# Patient Record
Sex: FEMALE | Race: WHITE
[De-identification: names, ages, dates, MRNs, and addresses within clinical notes are randomized per-mention and may not be internally consistent; named-entity substitution may affect disease eponyms.]

---

## 2017-03-29 ENCOUNTER — HOSPITAL ENCOUNTER (EMERGENCY)
Dept: HOSPITAL 26 - MED | Age: 27
Discharge: HOME | End: 2017-03-29
Payer: MEDICAID

## 2017-03-29 VITALS — SYSTOLIC BLOOD PRESSURE: 139 MMHG | DIASTOLIC BLOOD PRESSURE: 75 MMHG

## 2017-03-29 VITALS — DIASTOLIC BLOOD PRESSURE: 73 MMHG | SYSTOLIC BLOOD PRESSURE: 120 MMHG

## 2017-03-29 VITALS — BODY MASS INDEX: 50.79 KG/M2 | HEIGHT: 62 IN | WEIGHT: 276 LBS

## 2017-03-29 DIAGNOSIS — Z86.73: ICD-10-CM

## 2017-03-29 DIAGNOSIS — F17.200: ICD-10-CM

## 2017-03-29 DIAGNOSIS — R59.0: Primary | ICD-10-CM

## 2017-03-29 DIAGNOSIS — I10: ICD-10-CM

## 2017-03-29 PROCEDURE — 99283 EMERGENCY DEPT VISIT LOW MDM: CPT

## 2017-03-29 PROCEDURE — 96372 THER/PROPH/DIAG INJ SC/IM: CPT

## 2017-03-29 NOTE — NUR
26F BIB SELF C/O ACHING LEFT JAW PAIN, NON-RADIATING, 9/10 X YESTERDAY; PT 
DENIES TRAUMA OR INJURY TO FACE; NO SWELLING OR REDNESS NOTED TO SITE AT THIS 
TIME; PT STATED " I HAVE TWO TEETH ON THE TOP AND BOTTOM LEFT THAT NEED TO BE 
REMOVED. I THINK IT MAY BE FROM THAT"; A&OX4, PERRL, BL LUNG SOUNDS CLEAR, RR 
EVEN/UNLABORED, PT DENIES N/V/D AT THIS TIME; STEADY GAIT; PT RESTING IN BED W/ 
HOB ELEVATED AND IN LOWEST POSITION; POSITIONED FOR COMFORT; ER MD MADE AWARE 
OF STATUS. WILL CONTINUE TO MONITOR.

## 2017-03-29 NOTE — NUR
Patient discharged with v/s stable. Written and verbal after care instructions 
given and explained. 

Patient alert, oriented and verbalized understanding of instructions. 
Ambulatory with steady gait. All questions addressed prior to discharge. ID 
band removed. Patient advised to follow up with PMD. Rx of NORCO 5MG-325MG TAB 
& MOTRIN 800MG given. Patient educated on indication of medication including 
possible reaction and side effects. Opportunity to ask questions provided and 
answered.

## 2020-07-21 ENCOUNTER — HOSPITAL ENCOUNTER (EMERGENCY)
Dept: HOSPITAL 26 - MED | Age: 30
Discharge: HOME | End: 2020-07-21
Payer: MEDICAID

## 2020-07-21 VITALS — WEIGHT: 188 LBS | HEIGHT: 64 IN | BODY MASS INDEX: 32.1 KG/M2

## 2020-07-21 VITALS — SYSTOLIC BLOOD PRESSURE: 128 MMHG | DIASTOLIC BLOOD PRESSURE: 85 MMHG

## 2020-07-21 DIAGNOSIS — R10.9: Primary | ICD-10-CM

## 2020-07-21 DIAGNOSIS — Z86.73: ICD-10-CM

## 2020-07-21 DIAGNOSIS — I10: ICD-10-CM

## 2020-07-21 NOTE — NUR
28 y/o female from home c/o sudden onset abd pain. Pt c/o abd pain s/p eating 
shrimp. Abd soft, round, nontender to palp. Pt states she "forced" herself to 
vomit due to pain. RR even and unlabored, awake and alert. vss



medhx: stroke, seizures

## 2020-07-21 NOTE — NUR
Patient discharged with v/s stable. Written and verbal after care instructions 
given and explained. 

Patient alert, oriented and verbalized understanding of instructions. 
Ambulatory with steady gait. All questions addressed prior to discharge. ID 
band removed. Patient advised to follow up with PMD. Rx of pepcid 20mg, zofran 
4mg, norco 5mg given. Patient educated on indication of medication including 
possible reaction and side effects. Opportunity to ask questions provided and 
answered.

## 2020-08-27 ENCOUNTER — HOSPITAL ENCOUNTER (EMERGENCY)
Dept: HOSPITAL 26 - MED | Age: 30
Discharge: HOME | End: 2020-08-27
Payer: MEDICAID

## 2020-08-27 VITALS — DIASTOLIC BLOOD PRESSURE: 46 MMHG | SYSTOLIC BLOOD PRESSURE: 98 MMHG

## 2020-08-27 VITALS — BODY MASS INDEX: 30.9 KG/M2 | HEIGHT: 64 IN | WEIGHT: 181 LBS

## 2020-08-27 VITALS — SYSTOLIC BLOOD PRESSURE: 98 MMHG | DIASTOLIC BLOOD PRESSURE: 46 MMHG

## 2020-08-27 DIAGNOSIS — Z98.890: ICD-10-CM

## 2020-08-27 DIAGNOSIS — N83.201: Primary | ICD-10-CM

## 2020-08-27 DIAGNOSIS — I10: ICD-10-CM

## 2020-08-27 DIAGNOSIS — R56.9: ICD-10-CM

## 2020-08-27 DIAGNOSIS — Z86.73: ICD-10-CM

## 2020-08-27 DIAGNOSIS — K52.9: ICD-10-CM

## 2020-08-27 LAB
ALBUMIN FLD-MCNC: 3.8 G/DL (ref 3.4–5)
ANION GAP SERPL CALCULATED.3IONS-SCNC: 13.3 MMOL/L (ref 8–16)
AST SERPL-CCNC: 19 U/L (ref 15–37)
BASOPHILS # BLD AUTO: 0 K/UL (ref 0–0.22)
BASOPHILS NFR BLD AUTO: 0.4 % (ref 0–2)
BILIRUB SERPL-MCNC: 0.2 MG/DL (ref 0–1)
BUN SERPL-MCNC: 11 MG/DL (ref 7–18)
CHLORIDE SERPL-SCNC: 105 MMOL/L (ref 98–107)
CO2 SERPL-SCNC: 26.5 MMOL/L (ref 21–32)
CREAT SERPL-MCNC: 0.8 MG/DL (ref 0.6–1.3)
EOSINOPHIL # BLD AUTO: 0.3 K/UL (ref 0–0.4)
EOSINOPHIL NFR BLD AUTO: 4.5 % (ref 0–4)
ERYTHROCYTE [DISTWIDTH] IN BLOOD BY AUTOMATED COUNT: 20.1 % (ref 11.6–13.7)
GFR SERPL CREATININE-BSD FRML MDRD: 108 ML/MIN (ref 90–?)
GLUCOSE SERPL-MCNC: 86 MG/DL (ref 74–106)
HCT VFR BLD AUTO: 33.9 % (ref 36–48)
HGB BLD-MCNC: 10.7 G/DL (ref 12–16)
LYMPHOCYTES # BLD AUTO: 1.7 K/UL (ref 2.5–16.5)
LYMPHOCYTES NFR BLD AUTO: 22.1 % (ref 20.5–51.1)
MCH RBC QN AUTO: 23 PG (ref 27–31)
MCHC RBC AUTO-ENTMCNC: 31 G/DL (ref 33–37)
MCV RBC AUTO: 73.5 FL (ref 80–94)
MONOCYTES # BLD AUTO: 0.4 K/UL (ref 0.8–1)
MONOCYTES NFR BLD AUTO: 5.4 % (ref 1.7–9.3)
NEUTROPHILS # BLD AUTO: 5.2 K/UL (ref 1.8–7.7)
NEUTROPHILS NFR BLD AUTO: 67.6 % (ref 42.2–75.2)
PLATELET # BLD AUTO: 237 K/UL (ref 140–450)
POTASSIUM SERPL-SCNC: 3.8 MMOL/L (ref 3.5–5.1)
RBC # BLD AUTO: 4.62 MIL/UL (ref 4.2–5.4)
SODIUM SERPL-SCNC: 141 MMOL/L (ref 136–145)
WBC # BLD AUTO: 7.7 K/UL (ref 4.8–10.8)

## 2020-08-27 PROCEDURE — 36415 COLL VENOUS BLD VENIPUNCTURE: CPT

## 2020-08-27 PROCEDURE — 99284 EMERGENCY DEPT VISIT MOD MDM: CPT

## 2020-08-27 PROCEDURE — 96361 HYDRATE IV INFUSION ADD-ON: CPT

## 2020-08-27 PROCEDURE — 76856 US EXAM PELVIC COMPLETE: CPT

## 2020-08-27 PROCEDURE — 96374 THER/PROPH/DIAG INJ IV PUSH: CPT

## 2020-08-27 PROCEDURE — 80053 COMPREHEN METABOLIC PANEL: CPT

## 2020-08-27 PROCEDURE — 85025 COMPLETE CBC W/AUTO DIFF WBC: CPT

## 2020-08-27 NOTE — NUR
Patient discharged with v/s stable. Written and verbal after care instructions 
given and explained. 

Patient alert, oriented and verbalized understanding of instructions. 
Ambulatory with steady gait. All questions addressed prior to discharge. ID 
band removed. Patient advised to follow up with PMD. Rx of ZOFRAN & MOTRIN 
given. Patient educated on indication of medication including possible reaction 
and side effects. Opportunity to ask questions provided and answered.

## 2020-08-27 NOTE — NUR
ADMITTED PT ON BED 12. PT A/A/OX4, AMBULATORY. PT C/O ABDOMINAL PAIN WHICH 
STARTED AFTER THE PT ATE SALAD AND HAD 3 EPISODES OF LOOSE BM,DARK IN COLOR PER 
PT. ALSO STATED THAT SHE IS TAKING IRON PILLS FOR ANEMIA. OTHERWISE NO SIGN AND 
SYMPTOMS OF DISTRESS NOTED AT THIS TIME. STARTED AN IV ON THE LEFT HAND GAUGE 
20.ALL LABS DRAWN AS ORDERED. TORADOL GIVEN FOR ABDOMINAL PAIN 10/10 AS 
ORDERED.IVF NS INFUSING AS ORDERED.

## 2020-09-09 ENCOUNTER — HOSPITAL ENCOUNTER (EMERGENCY)
Dept: HOSPITAL 26 - MED | Age: 30
Discharge: HOME | End: 2020-09-09
Payer: MEDICAID

## 2020-09-09 VITALS — SYSTOLIC BLOOD PRESSURE: 129 MMHG | DIASTOLIC BLOOD PRESSURE: 73 MMHG

## 2020-09-09 VITALS — SYSTOLIC BLOOD PRESSURE: 136 MMHG | DIASTOLIC BLOOD PRESSURE: 78 MMHG

## 2020-09-09 VITALS — HEIGHT: 61 IN | WEIGHT: 181 LBS | BODY MASS INDEX: 34.17 KG/M2

## 2020-09-09 DIAGNOSIS — N39.0: ICD-10-CM

## 2020-09-09 DIAGNOSIS — N83.209: ICD-10-CM

## 2020-09-09 DIAGNOSIS — K52.9: Primary | ICD-10-CM

## 2020-09-09 DIAGNOSIS — Z98.890: ICD-10-CM

## 2020-09-09 DIAGNOSIS — I63.9: ICD-10-CM

## 2020-09-09 DIAGNOSIS — R56.9: ICD-10-CM

## 2020-09-09 PROCEDURE — 96375 TX/PRO/DX INJ NEW DRUG ADDON: CPT

## 2020-09-09 PROCEDURE — 81025 URINE PREGNANCY TEST: CPT

## 2020-09-09 PROCEDURE — 99284 EMERGENCY DEPT VISIT MOD MDM: CPT

## 2020-09-09 PROCEDURE — 81002 URINALYSIS NONAUTO W/O SCOPE: CPT

## 2020-09-09 PROCEDURE — 96361 HYDRATE IV INFUSION ADD-ON: CPT

## 2020-09-09 PROCEDURE — 96365 THER/PROPH/DIAG IV INF INIT: CPT

## 2020-09-09 NOTE — NUR
Patient discharged with v/s stable. Written and verbal after care instructions 
given and explained. 

Patient alert, oriented and verbalized understanding of instructions. 
Ambulatory with steady gait. All questions addressed prior to discharge. ID 
band removed. Patient advised to follow up with PMD. Rx of CEPHALEXIN, EXTRA 
STRENGTH TYLENOL given. Patient educated on indication of medication including 
possible reaction and side effects. Opportunity to ask questions provided and 
answered.

## 2020-09-09 NOTE — NUR
31 YO F BIB SELF FOR C/C OF 10/10 MID EPIGASTRIC PAIN X1.5 HOURS. PT STATES SHE 
HAS HAD N/V/D SINCE THE PAIN STARTED  "OUT OF NO WHERE." DENIES FEVER, COUGH, 
SOB AND BODY ACHES. LBM WAS 1 HR AGO, LOOSE STOOL. PT STATES SHE TOOK IBUPROFEN 
800MG AND 4MG OF ZOFRAN 45 MIN AGO WITHOUT RELIEF OF SYMPTOMS. PT RECENTLY HAS 
D/C FROM [facility] ER FOR OVARIAN CYSTS, STATES HER PAIN IS THE SAME AS THEN. 



NKA

MED HX:OVARIAN CYST 

RX: ZOFRAN PRN

## 2020-10-07 ENCOUNTER — HOSPITAL ENCOUNTER (EMERGENCY)
Dept: HOSPITAL 26 - MED | Age: 30
Discharge: HOME | End: 2020-10-07
Payer: MEDICAID

## 2020-10-07 VITALS — HEIGHT: 64 IN | WEIGHT: 179 LBS | BODY MASS INDEX: 30.56 KG/M2

## 2020-10-07 VITALS — SYSTOLIC BLOOD PRESSURE: 120 MMHG | DIASTOLIC BLOOD PRESSURE: 72 MMHG

## 2020-10-07 VITALS — DIASTOLIC BLOOD PRESSURE: 70 MMHG | SYSTOLIC BLOOD PRESSURE: 119 MMHG

## 2020-10-07 DIAGNOSIS — Z98.890: ICD-10-CM

## 2020-10-07 DIAGNOSIS — R19.7: ICD-10-CM

## 2020-10-07 DIAGNOSIS — R56.9: ICD-10-CM

## 2020-10-07 DIAGNOSIS — I63.9: ICD-10-CM

## 2020-10-07 DIAGNOSIS — R10.9: Primary | ICD-10-CM

## 2020-10-07 DIAGNOSIS — R11.2: ICD-10-CM

## 2020-10-07 PROCEDURE — 81002 URINALYSIS NONAUTO W/O SCOPE: CPT

## 2020-10-07 PROCEDURE — 81025 URINE PREGNANCY TEST: CPT

## 2020-10-07 PROCEDURE — 99283 EMERGENCY DEPT VISIT LOW MDM: CPT

## 2020-10-07 PROCEDURE — 96372 THER/PROPH/DIAG INJ SC/IM: CPT

## 2020-10-07 RX ADMIN — KETOROLAC TROMETHAMINE ONE MG: 30 INJECTION, SOLUTION INTRAMUSCULAR; INTRAVENOUS at 13:22

## 2020-10-07 NOTE — NUR
PT IN FOR N/V X1DAY AND ABDOMINAL PAIN. PATIENT CURRENTLY WITH 10/10 PAIN 
THROUGHOUT ABDOMEN, WITH TENDERNESS AND GUARDING ON ASSESSMENT. PT ALSO STATES 
DIARRHEA YESTERDAY BUT NONE TODAY. PT STATES MENSTRAL CYCLE IS LATE BUT DENIES 
POSSIBILITY THAT SHE IS PREGNANT. RESTING IN BED AT THIS TIME WITHOUT ANY SIGNS 
OF DISTRESS.

## 2020-12-22 ENCOUNTER — HOSPITAL ENCOUNTER (EMERGENCY)
Dept: HOSPITAL 26 - MED | Age: 30
Discharge: LEFT BEFORE DISCHARGE | End: 2020-12-22
Payer: MEDICAID

## 2020-12-22 VITALS — SYSTOLIC BLOOD PRESSURE: 137 MMHG | DIASTOLIC BLOOD PRESSURE: 74 MMHG

## 2020-12-22 VITALS — HEIGHT: 64 IN | WEIGHT: 175 LBS | BODY MASS INDEX: 29.88 KG/M2

## 2020-12-22 DIAGNOSIS — Z53.21: Primary | ICD-10-CM

## 2020-12-22 NOTE — NUR
-------------------------------------------------------------------------------

            *** Note undone in [facility] - 12/23/20 at 0620 by MICHELA ***            

-------------------------------------------------------------------------------

CALLED FOR THE SECOND TIME , NO RESPONSE

## 2021-04-09 ENCOUNTER — HOSPITAL ENCOUNTER (EMERGENCY)
Dept: HOSPITAL 26 - MED | Age: 31
Discharge: HOME | End: 2021-04-09
Payer: MEDICAID

## 2021-04-09 VITALS — DIASTOLIC BLOOD PRESSURE: 60 MMHG | SYSTOLIC BLOOD PRESSURE: 105 MMHG

## 2021-04-09 VITALS — BODY MASS INDEX: 30.39 KG/M2 | WEIGHT: 178 LBS | HEIGHT: 64 IN

## 2021-04-09 VITALS — SYSTOLIC BLOOD PRESSURE: 114 MMHG | DIASTOLIC BLOOD PRESSURE: 74 MMHG

## 2021-04-09 DIAGNOSIS — K80.80: Primary | ICD-10-CM

## 2021-04-09 DIAGNOSIS — Z86.73: ICD-10-CM

## 2021-04-09 LAB
ALBUMIN FLD-MCNC: 3.6 G/DL (ref 3.4–5)
ANION GAP SERPL CALCULATED.3IONS-SCNC: 9 MMOL/L (ref 8–16)
AST SERPL-CCNC: 12 U/L (ref 15–37)
BASOPHILS # BLD AUTO: 0 K/UL (ref 0–0.22)
BASOPHILS NFR BLD AUTO: 0.8 % (ref 0–2)
BILIRUB SERPL-MCNC: 0.2 MG/DL (ref 0–1)
BUN SERPL-MCNC: 8 MG/DL (ref 7–18)
CHLORIDE SERPL-SCNC: 106 MMOL/L (ref 98–107)
CO2 SERPL-SCNC: 26.5 MMOL/L (ref 21–32)
CREAT SERPL-MCNC: 0.7 MG/DL (ref 0.6–1.3)
EOSINOPHIL # BLD AUTO: 0.1 K/UL (ref 0–0.4)
EOSINOPHIL NFR BLD AUTO: 2.8 % (ref 0–4)
ERYTHROCYTE [DISTWIDTH] IN BLOOD BY AUTOMATED COUNT: 17.6 % (ref 11.6–13.7)
GFR SERPL CREATININE-BSD FRML MDRD: 125 ML/MIN (ref 90–?)
GLUCOSE SERPL-MCNC: 73 MG/DL (ref 74–106)
HCT VFR BLD AUTO: 30.8 % (ref 36–48)
HGB BLD-MCNC: 9.6 G/DL (ref 12–16)
LIPASE SERPL-CCNC: 68 U/L (ref 73–393)
LYMPHOCYTES # BLD AUTO: 2 K/UL (ref 2.5–16.5)
LYMPHOCYTES NFR BLD AUTO: 38.7 % (ref 20.5–51.1)
MCH RBC QN AUTO: 23 PG (ref 27–31)
MCHC RBC AUTO-ENTMCNC: 31 G/DL (ref 33–37)
MCV RBC AUTO: 74.3 FL (ref 80–94)
MONOCYTES # BLD AUTO: 0.5 K/UL (ref 0.8–1)
MONOCYTES NFR BLD AUTO: 9.3 % (ref 1.7–9.3)
NEUTROPHILS # BLD AUTO: 2.4 K/UL (ref 1.8–7.7)
NEUTROPHILS NFR BLD AUTO: 48.4 % (ref 42.2–75.2)
PLATELET # BLD AUTO: 247 K/UL (ref 140–450)
POTASSIUM SERPL-SCNC: 3.5 MMOL/L (ref 3.5–5.1)
RBC # BLD AUTO: 4.14 MIL/UL (ref 4.2–5.4)
SODIUM SERPL-SCNC: 138 MMOL/L (ref 136–145)
WBC # BLD AUTO: 5 K/UL (ref 4.8–10.8)

## 2021-04-09 PROCEDURE — 81025 URINE PREGNANCY TEST: CPT

## 2021-04-09 PROCEDURE — 76705 ECHO EXAM OF ABDOMEN: CPT

## 2021-04-09 PROCEDURE — 80053 COMPREHEN METABOLIC PANEL: CPT

## 2021-04-09 PROCEDURE — 81002 URINALYSIS NONAUTO W/O SCOPE: CPT

## 2021-04-09 PROCEDURE — 99285 EMERGENCY DEPT VISIT HI MDM: CPT

## 2021-04-09 PROCEDURE — 85025 COMPLETE CBC W/AUTO DIFF WBC: CPT

## 2021-04-09 PROCEDURE — 74176 CT ABD & PELVIS W/O CONTRAST: CPT

## 2021-04-09 PROCEDURE — 36415 COLL VENOUS BLD VENIPUNCTURE: CPT

## 2021-04-09 PROCEDURE — 83690 ASSAY OF LIPASE: CPT

## 2021-04-09 NOTE — NUR
30 F BIB self c/c 10/10 abd pain that radiates to the back x1 day aggravated by 
eating, neck pain,  bloating especially after meals, right arm pain, blurry 
vision today at work, and constipation x1week. Pt reports nause but denies 
vomitting and diarrhea, SOB, chest pain. 





PMH: Gallstones, pre-eclampsia, stroke and seizure during pregnancy

SurHX: 4 C-sections

NKA

LMP: End of March 

RX: Denies

## 2021-04-09 NOTE — NUR
Patient discharged with v/s stable. Written and verbal after care instructions 
given and explained. 

Patient alert, oriented and verbalized understanding of instructions. 
Ambulatory with steady gait. All questions addressed prior to discharge. ID 
band removed. Patient advised to follow up with PMD. Rx of Acetaminophen, 
Mylanta Maximum Strength, Miralax given. Patient educated on indication of 
medication including possible reaction and side effects. Opportunity to ask 
questions provided and answered.

## 2021-05-05 ENCOUNTER — HOSPITAL ENCOUNTER (EMERGENCY)
Dept: HOSPITAL 26 - MED | Age: 31
Discharge: LEFT BEFORE DISCHARGE | End: 2021-05-05
Payer: MEDICAID

## 2021-05-05 DIAGNOSIS — Z53.21: Primary | ICD-10-CM

## 2021-07-09 ENCOUNTER — HOSPITAL ENCOUNTER (EMERGENCY)
Dept: HOSPITAL 26 - MED | Age: 31
Discharge: HOME | End: 2021-07-09
Payer: MEDICAID

## 2021-07-09 VITALS — WEIGHT: 175 LBS | BODY MASS INDEX: 29.88 KG/M2 | HEIGHT: 64 IN

## 2021-07-09 VITALS — DIASTOLIC BLOOD PRESSURE: 100 MMHG | SYSTOLIC BLOOD PRESSURE: 118 MMHG

## 2021-07-09 DIAGNOSIS — R51.9: ICD-10-CM

## 2021-07-09 DIAGNOSIS — R22.0: Primary | ICD-10-CM

## 2021-07-09 DIAGNOSIS — Z86.73: ICD-10-CM

## 2021-07-09 DIAGNOSIS — Z79.899: ICD-10-CM

## 2021-07-09 PROCEDURE — 99283 EMERGENCY DEPT VISIT LOW MDM: CPT

## 2021-07-09 NOTE — NUR
RECEIVED IN BED 2 FROM LOBBY WITH C/O HEADACHE AND FACIAL PAIN SINCEE YESTERDAY 
. PT STATES FACIAL PAIN BEGIND IN UPPER GUMS . SKIN IS PINK/WARM/DRY; AAOX4 
WITH EVEN AND STEADY GAIT; PT DENIES ANY FEVER, CP, SOB, OR COUGH AT THIS TIME; 
VSS; PATIENT POSITIONED FOR COMFORT; HOB ELEVATED; BED DOWN. ER MD MADE AWARE 
OF PT STATUS.

## 2021-07-09 NOTE — NUR
Patient discharged with v/s stable. Written and verbal after care instructions 
given and explained. 

Patient alert, oriented and verbalized understanding of instructions. 
Ambulatory with steady gait. All questions addressed prior to discharge. ID 
band removed. Patient advised to follow up with PMD. Rx of HYDROCODONE & PEN VK 
given. Patient educated on indication of medication including possible reaction 
and side effects. Opportunity to ask questions provided and answered.

## 2021-12-21 ENCOUNTER — HOSPITAL ENCOUNTER (EMERGENCY)
Dept: HOSPITAL 26 - MED | Age: 31
Discharge: HOME | End: 2021-12-21
Payer: MEDICAID

## 2021-12-21 VITALS — HEIGHT: 64 IN | WEIGHT: 220 LBS | BODY MASS INDEX: 37.56 KG/M2

## 2021-12-21 VITALS — DIASTOLIC BLOOD PRESSURE: 80 MMHG | SYSTOLIC BLOOD PRESSURE: 121 MMHG

## 2021-12-21 DIAGNOSIS — Z79.899: ICD-10-CM

## 2021-12-21 DIAGNOSIS — Z20.822: ICD-10-CM

## 2021-12-21 DIAGNOSIS — U07.1: Primary | ICD-10-CM

## 2021-12-21 DIAGNOSIS — F17.210: ICD-10-CM

## 2021-12-21 DIAGNOSIS — Z86.73: ICD-10-CM

## 2021-12-21 PROCEDURE — 96372 THER/PROPH/DIAG INJ SC/IM: CPT

## 2021-12-21 PROCEDURE — 87426 SARSCOV CORONAVIRUS AG IA: CPT

## 2021-12-21 PROCEDURE — 81002 URINALYSIS NONAUTO W/O SCOPE: CPT

## 2021-12-21 PROCEDURE — 99284 EMERGENCY DEPT VISIT MOD MDM: CPT

## 2021-12-21 PROCEDURE — 71045 X-RAY EXAM CHEST 1 VIEW: CPT

## 2021-12-21 PROCEDURE — 81025 URINE PREGNANCY TEST: CPT

## 2021-12-21 NOTE — NUR
32 Y/O FEMALE WITH DYSURIA, LOWER BACK PAIN, SUBJECTIVE FEVER, CHILLS, HA, 
VAGINAL ODOR/DISCHARGE X2 DAYS. DENIES N/V AT THIS TIME. PT STATES 10/10 PAIN, 
WITH CHEST PAIN, COUGH AND CONGESTION. LUNG SOUNDS CLEAR. ABD SOFT NON TENDER. 
VSS. 



PMH: GALLSTONES

NKA

## 2021-12-21 NOTE — NUR
Patient discharged with v/s stable. Written and verbal after care instructions 
given and explained. 

Patient alert, oriented and verbalized understanding of instructions. 
Ambulatory with steady gait. All questions addressed prior to discharge. ID 
band removed. Patient advised to follow up with PMD. Rx of NAPROSYN AND 
GUAIFENESIN given. Patient educated on indication of medication including 
possible reaction and side effects. Opportunity to ask questions provided and 
answered.

## 2022-03-10 ENCOUNTER — HOSPITAL ENCOUNTER (EMERGENCY)
Dept: HOSPITAL 26 - MED | Age: 32
Discharge: HOME | End: 2022-03-10
Payer: SELF-PAY

## 2022-03-10 VITALS — DIASTOLIC BLOOD PRESSURE: 74 MMHG | SYSTOLIC BLOOD PRESSURE: 122 MMHG

## 2022-03-10 VITALS — WEIGHT: 215.25 LBS | HEIGHT: 64 IN | BODY MASS INDEX: 36.75 KG/M2

## 2022-03-10 DIAGNOSIS — Z98.890: ICD-10-CM

## 2022-03-10 DIAGNOSIS — Z79.899: ICD-10-CM

## 2022-03-10 DIAGNOSIS — R14.0: ICD-10-CM

## 2022-03-10 DIAGNOSIS — F17.210: ICD-10-CM

## 2022-03-10 DIAGNOSIS — R10.2: Primary | ICD-10-CM

## 2022-03-10 DIAGNOSIS — R11.0: ICD-10-CM

## 2022-03-10 LAB
APPEARANCE UR: (no result)
BILIRUB UR QL STRIP: NEGATIVE
COLOR UR: YELLOW
GLUCOSE UR STRIP-MCNC: NEGATIVE MG/DL
HGB UR QL STRIP: NEGATIVE
LEUKOCYTE ESTERASE UR QL STRIP: NEGATIVE
NITRITE UR QL STRIP: POSITIVE
PH UR STRIP: 7.5 [PH] (ref 5–9)
RBC #/AREA URNS HPF: (no result) /HPF (ref 0–5)
WBC,URINE: (no result) /HPF (ref 0–5)

## 2022-03-10 NOTE — NUR
Patient discharged, stable. Written and verbal after care instructions given 
and explained. 

Patient alert, oriented and verbalized understanding of instructions. 
Ambulatory with steady gait. All questions addressed prior to discharge. ID 
band removed. Patient advised to follow up with PMD. Rx of NAPROXEN given. 
Patient educated on indication of medication including possible reaction and 
side effects. Opportunity to ask questions provided and answered.

## 2022-03-10 NOTE — NUR
32 Y/O FEMALE C/O ABD PAIN X 1 DAY. 8/10 PRESSURE WITH C/O NAUSEA AND 
CONSTIPATION. ABD SOFT NON TENDER. 



MED HX: DENIES

NKA

## 2022-04-18 ENCOUNTER — HOSPITAL ENCOUNTER (EMERGENCY)
Dept: HOSPITAL 26 - MED | Age: 32
Discharge: HOME | End: 2022-04-18
Payer: MEDICAID

## 2022-04-18 VITALS — DIASTOLIC BLOOD PRESSURE: 82 MMHG | SYSTOLIC BLOOD PRESSURE: 128 MMHG

## 2022-04-18 VITALS — BODY MASS INDEX: 36.37 KG/M2 | WEIGHT: 213 LBS | HEIGHT: 64 IN

## 2022-04-18 DIAGNOSIS — Z79.899: ICD-10-CM

## 2022-04-18 DIAGNOSIS — R10.9: Primary | ICD-10-CM

## 2022-04-18 DIAGNOSIS — Z86.73: ICD-10-CM

## 2022-05-10 ENCOUNTER — HOSPITAL ENCOUNTER (EMERGENCY)
Dept: HOSPITAL 26 - MED | Age: 32
Discharge: HOME | End: 2022-05-10
Payer: MEDICAID

## 2022-05-10 VITALS — DIASTOLIC BLOOD PRESSURE: 95 MMHG | SYSTOLIC BLOOD PRESSURE: 127 MMHG

## 2022-05-10 VITALS — SYSTOLIC BLOOD PRESSURE: 127 MMHG | DIASTOLIC BLOOD PRESSURE: 95 MMHG

## 2022-05-10 VITALS — WEIGHT: 216 LBS | BODY MASS INDEX: 35.99 KG/M2 | HEIGHT: 65 IN

## 2022-05-10 DIAGNOSIS — Z86.73: ICD-10-CM

## 2022-05-10 DIAGNOSIS — Z01.84: ICD-10-CM

## 2022-05-10 DIAGNOSIS — Z20.822: ICD-10-CM

## 2022-05-10 DIAGNOSIS — J02.9: Primary | ICD-10-CM

## 2022-05-10 DIAGNOSIS — Z79.899: ICD-10-CM

## 2022-05-10 DIAGNOSIS — R49.0: ICD-10-CM

## 2022-05-10 NOTE — NUR
30 Y/O FEMALE C/O THROAT PAIN 9/10 X2DAYS DESCRIBES AS TIGHTNESS, STATES "IT 
FEELS LIKE SOMETHING IS STUCK THERE". DENIES N/V. DENIES FEVER/CHILLS. SPO2 
100% ON RA.



DENIES PMH



NKA

## 2022-05-10 NOTE — NUR
Patient discharged with v/s stable. Written and verbal after care instructions 
given FOR PHARYNGITIS AND HOARSNESS and explained. 

Patient alert, oriented and verbalized understanding of instructions. 
Ambulatory with steady gait. All questions addressed prior to discharge. ID 
band removed. Patient advised to follow up with PMD. Rx of IBUPORFEN given. 
Patient educated on indication of medication including possible reaction and 
side effects. Opportunity to ask questions provided and answered.

## 2023-02-06 ENCOUNTER — HOSPITAL ENCOUNTER (EMERGENCY)
Dept: HOSPITAL 26 - MED | Age: 33
Discharge: HOME | End: 2023-02-06
Payer: MEDICAID

## 2023-02-06 VITALS — BODY MASS INDEX: 40.66 KG/M2 | HEIGHT: 66 IN | WEIGHT: 253 LBS

## 2023-02-06 VITALS — SYSTOLIC BLOOD PRESSURE: 143 MMHG | DIASTOLIC BLOOD PRESSURE: 81 MMHG

## 2023-02-06 VITALS — DIASTOLIC BLOOD PRESSURE: 73 MMHG | SYSTOLIC BLOOD PRESSURE: 122 MMHG

## 2023-02-06 DIAGNOSIS — J06.9: Primary | ICD-10-CM

## 2023-02-06 DIAGNOSIS — M79.10: ICD-10-CM

## 2023-02-06 DIAGNOSIS — R50.9: ICD-10-CM

## 2023-02-06 DIAGNOSIS — R05.9: ICD-10-CM

## 2023-02-06 DIAGNOSIS — Z86.73: ICD-10-CM

## 2023-02-06 DIAGNOSIS — Z20.822: ICD-10-CM

## 2023-02-06 DIAGNOSIS — Z79.899: ICD-10-CM

## 2023-02-06 DIAGNOSIS — F17.210: ICD-10-CM

## 2023-02-06 PROCEDURE — 87804 INFLUENZA ASSAY W/OPTIC: CPT

## 2023-02-06 PROCEDURE — 99284 EMERGENCY DEPT VISIT MOD MDM: CPT

## 2023-02-06 PROCEDURE — 71045 X-RAY EXAM CHEST 1 VIEW: CPT

## 2023-02-06 PROCEDURE — 87426 SARSCOV CORONAVIRUS AG IA: CPT

## 2023-02-06 NOTE — NUR
33 y/o female bib self with c/o cough, subjective fever and diarrhea x 
yesterday. Patient is taking Tylenol for symptoms. Patient is requesting a 
COVID test because her nieces are sick with similar symptoms. 



Medical History: Denies

NKDA

## 2023-02-06 NOTE — NUR
Patient discharged with v/s stable. Written and verbal after care instructions 
given.

Patient alert, oriented and verbalized understanding of instructions. 
Ambulatory with steady gait. All questions addressed prior to discharge. ID 
band removed. Patient advised to follow up with PMD. Rx of Proventil and 
Benzonatate given. Opportunity to ask questions provided and answered.

WORK NOTE HANDED TO PATIENT.

## 2023-05-09 ENCOUNTER — HOSPITAL ENCOUNTER (EMERGENCY)
Dept: HOSPITAL 26 - MED | Age: 33
Discharge: HOME | End: 2023-05-09
Payer: MEDICAID

## 2023-05-09 VITALS — BODY MASS INDEX: 45.99 KG/M2 | WEIGHT: 269.38 LBS | HEIGHT: 64 IN

## 2023-05-09 VITALS — SYSTOLIC BLOOD PRESSURE: 142 MMHG | DIASTOLIC BLOOD PRESSURE: 80 MMHG

## 2023-05-09 DIAGNOSIS — J02.9: Primary | ICD-10-CM

## 2023-05-09 DIAGNOSIS — Z79.899: ICD-10-CM

## 2023-05-09 DIAGNOSIS — B97.89: ICD-10-CM

## 2023-05-09 DIAGNOSIS — Z20.822: ICD-10-CM

## 2023-05-09 DIAGNOSIS — Z86.73: ICD-10-CM

## 2023-05-09 NOTE — NUR
Patient discharged with v/s stable. Written and verbal after care instructions 
FOR PHARYNGITIS given and explained. 

Patient alert, oriented and verbalized understanding of instructions. 
Ambulatory with steady gait. All questions addressed prior to discharge. ID 
band removed. Patient advised to follow up with PMD. Rx of 
IBUPROFEN,BENZOCAINE,TYLENOL XTRA STRENGTH AND PROMETHAZINE given.  Opportunity 
to ask questions provided and answered.

## 2023-07-10 ENCOUNTER — HOSPITAL ENCOUNTER (EMERGENCY)
Dept: HOSPITAL 26 - MED | Age: 33
Discharge: HOME | End: 2023-07-10
Payer: MEDICAID

## 2023-07-10 VITALS — WEIGHT: 234 LBS | BODY MASS INDEX: 37.61 KG/M2 | HEIGHT: 66 IN

## 2023-07-10 VITALS
RESPIRATION RATE: 18 BRPM | SYSTOLIC BLOOD PRESSURE: 107 MMHG | HEART RATE: 86 BPM | TEMPERATURE: 97.6 F | DIASTOLIC BLOOD PRESSURE: 73 MMHG | OXYGEN SATURATION: 98 %

## 2023-07-10 DIAGNOSIS — U07.1: Primary | ICD-10-CM

## 2023-07-10 DIAGNOSIS — Z79.899: ICD-10-CM

## 2023-07-10 DIAGNOSIS — Z86.73: ICD-10-CM

## 2023-07-10 DIAGNOSIS — F17.210: ICD-10-CM

## 2023-12-28 ENCOUNTER — HOSPITAL ENCOUNTER (EMERGENCY)
Dept: HOSPITAL 26 - MED | Age: 33
Discharge: HOME | End: 2023-12-28
Payer: SELF-PAY

## 2023-12-28 VITALS — WEIGHT: 288 LBS | BODY MASS INDEX: 53 KG/M2 | HEIGHT: 62 IN

## 2023-12-28 VITALS
OXYGEN SATURATION: 98 % | SYSTOLIC BLOOD PRESSURE: 105 MMHG | DIASTOLIC BLOOD PRESSURE: 76 MMHG | RESPIRATION RATE: 20 BRPM | TEMPERATURE: 97.9 F | HEART RATE: 102 BPM

## 2023-12-28 VITALS
RESPIRATION RATE: 20 BRPM | DIASTOLIC BLOOD PRESSURE: 76 MMHG | OXYGEN SATURATION: 98 % | TEMPERATURE: 97.9 F | HEART RATE: 102 BPM | SYSTOLIC BLOOD PRESSURE: 105 MMHG

## 2023-12-28 DIAGNOSIS — Z79.1: ICD-10-CM

## 2023-12-28 DIAGNOSIS — Z79.2: ICD-10-CM

## 2023-12-28 DIAGNOSIS — R00.0: ICD-10-CM

## 2023-12-28 DIAGNOSIS — J06.9: Primary | ICD-10-CM

## 2023-12-28 DIAGNOSIS — Z86.73: ICD-10-CM

## 2023-12-28 DIAGNOSIS — Z20.822: ICD-10-CM

## 2023-12-28 DIAGNOSIS — G51.0: ICD-10-CM

## 2023-12-28 DIAGNOSIS — Z86.69: ICD-10-CM

## 2023-12-28 DIAGNOSIS — I10: ICD-10-CM

## 2023-12-28 DIAGNOSIS — Z79.899: ICD-10-CM

## 2023-12-28 LAB
FLUAV AG UPPER RESP QL IA.RAPID: POSITIVE
FLUBV AG UPPER RESP QL IA.RAPID: NEGATIVE

## 2023-12-28 PROCEDURE — 96372 THER/PROPH/DIAG INJ SC/IM: CPT

## 2023-12-28 PROCEDURE — 87426 SARSCOV CORONAVIRUS AG IA: CPT

## 2023-12-28 PROCEDURE — 87804 INFLUENZA ASSAY W/OPTIC: CPT

## 2023-12-28 PROCEDURE — 99283 EMERGENCY DEPT VISIT LOW MDM: CPT

## 2024-05-02 ENCOUNTER — HOSPITAL ENCOUNTER (EMERGENCY)
Dept: HOSPITAL 26 - MED | Age: 34
Discharge: HOME | End: 2024-05-02
Payer: MEDICAID

## 2024-05-02 VITALS
RESPIRATION RATE: 17 BRPM | DIASTOLIC BLOOD PRESSURE: 74 MMHG | TEMPERATURE: 97.7 F | SYSTOLIC BLOOD PRESSURE: 120 MMHG | HEART RATE: 78 BPM | OXYGEN SATURATION: 100 %

## 2024-05-02 VITALS
SYSTOLIC BLOOD PRESSURE: 148 MMHG | HEART RATE: 68 BPM | TEMPERATURE: 98.2 F | RESPIRATION RATE: 16 BRPM | OXYGEN SATURATION: 100 % | DIASTOLIC BLOOD PRESSURE: 83 MMHG

## 2024-05-02 VITALS — BODY MASS INDEX: 50.02 KG/M2 | WEIGHT: 293 LBS | HEIGHT: 64 IN

## 2024-05-02 VITALS — OXYGEN SATURATION: 100 %

## 2024-05-02 DIAGNOSIS — Z79.899: ICD-10-CM

## 2024-05-02 DIAGNOSIS — R42: ICD-10-CM

## 2024-05-02 DIAGNOSIS — R00.2: Primary | ICD-10-CM

## 2024-05-02 DIAGNOSIS — Z86.69: ICD-10-CM

## 2024-05-02 DIAGNOSIS — I10: ICD-10-CM

## 2024-05-02 LAB
ANION GAP SERPL CALCULATED.3IONS-SCNC: 14.6 MMOL/L (ref 8–16)
BASOPHILS # BLD AUTO: 0 K/UL (ref 0–0.22)
BASOPHILS NFR BLD AUTO: 0.5 % (ref 0–2)
BUN SERPL-MCNC: 15 MG/DL (ref 7–18)
CALCIUM SPEC-MCNC: 9.1 MG/DL (ref 8.5–10.1)
CHLORIDE SERPL-SCNC: 104 MMOL/L (ref 98–107)
CO2 SERPL-SCNC: 25.6 MMOL/L (ref 21–32)
CREAT SERPL-MCNC: 0.9 MG/DL (ref 0.6–1.3)
EOSINOPHIL # BLD AUTO: 0.1 K/UL (ref 0–0.4)
EOSINOPHIL NFR BLD AUTO: 0.8 % (ref 0–4)
ERYTHROCYTE [DISTWIDTH] IN BLOOD BY AUTOMATED COUNT: 17 % (ref 11.6–13.7)
GFR SERPL CREATININE-BSD FRML MDRD: 92 ML/MIN (ref 90–?)
GLUCOSE SERPL-MCNC: 102 MG/DL (ref 74–106)
HCT VFR BLD AUTO: 38.9 % (ref 36–48)
HGB BLD-MCNC: 12.8 G/DL (ref 12–16)
LYMPHOCYTES # BLD AUTO: 1.9 K/UL (ref 2.5–16.5)
LYMPHOCYTES NFR BLD AUTO: 19.7 % (ref 20.5–51.1)
MAGNESIUM SERPL-MCNC: 1.9 MG/DL (ref 1.8–2.4)
MCH RBC QN AUTO: 26 PG (ref 27–31)
MCHC RBC AUTO-ENTMCNC: 33 G/DL (ref 33–37)
MCV RBC AUTO: 78.7 FL (ref 80–94)
MONOCYTES # BLD AUTO: 0.5 K/UL (ref 0.8–1)
MONOCYTES NFR BLD AUTO: 5.2 % (ref 1.7–9.3)
NEUTROPHILS # BLD AUTO: 7.1 K/UL (ref 1.8–7.7)
NEUTROPHILS NFR BLD AUTO: 73.8 % (ref 42.2–75.2)
PHOSPHATE SERPL-MCNC: 3.5 MG/DL (ref 2.5–4.9)
PLATELET # BLD AUTO: 251 K/UL (ref 140–450)
POTASSIUM SERPL-SCNC: 4.2 MMOL/L (ref 3.5–5.1)
RBC # BLD AUTO: 4.95 MIL/UL (ref 4.2–5.4)
SODIUM SERPL-SCNC: 140 MMOL/L (ref 136–145)
TSH SERPL DL<=0.05 MIU/L-ACNC: 6.02 UIU/ML (ref 0.34–3.74)
WBC # BLD AUTO: 9.7 K/UL (ref 4.8–10.8)

## 2024-05-08 ENCOUNTER — HOSPITAL ENCOUNTER (EMERGENCY)
Dept: HOSPITAL 26 - MED | Age: 34
LOS: 1 days | Discharge: HOME | End: 2024-05-09
Payer: MEDICAID

## 2024-05-08 VITALS — WEIGHT: 293 LBS | HEIGHT: 64 IN | BODY MASS INDEX: 50.02 KG/M2

## 2024-05-08 VITALS
SYSTOLIC BLOOD PRESSURE: 118 MMHG | HEART RATE: 69 BPM | DIASTOLIC BLOOD PRESSURE: 87 MMHG | OXYGEN SATURATION: 99 % | RESPIRATION RATE: 18 BRPM | TEMPERATURE: 97.8 F

## 2024-05-08 DIAGNOSIS — R51.9: ICD-10-CM

## 2024-05-08 DIAGNOSIS — R07.9: ICD-10-CM

## 2024-05-08 DIAGNOSIS — I10: ICD-10-CM

## 2024-05-08 DIAGNOSIS — R10.84: Primary | ICD-10-CM

## 2024-05-08 DIAGNOSIS — R06.00: ICD-10-CM

## 2024-05-08 DIAGNOSIS — R06.02: ICD-10-CM

## 2024-05-08 DIAGNOSIS — Z79.899: ICD-10-CM

## 2024-05-08 LAB
AMPHET UR-MCNC: NEGATIVE NG/ML
ANION GAP SERPL CALCULATED.3IONS-SCNC: 10.6 MMOL/L (ref 8–16)
APPEARANCE UR: (no result)
BARBITURATES UR QL SCN: NEGATIVE NG/ML
BASOPHILS # BLD AUTO: 0 K/UL (ref 0–0.22)
BASOPHILS NFR BLD AUTO: 0.6 % (ref 0–2)
BENZODIAZ UR QL SCN: NEGATIVE NG/ML
BILIRUB UR QL STRIP: NEGATIVE
BUN SERPL-MCNC: 15 MG/DL (ref 7–18)
BZE UR QL SCN: NEGATIVE NG/ML
CALCIUM SPEC-MCNC: 9 MG/DL (ref 8.5–10.1)
CANNABINOIDS UR QL SCN: NEGATIVE NG/ML
CHLORIDE SERPL-SCNC: 105 MMOL/L (ref 98–107)
CO2 SERPL-SCNC: 28.9 MMOL/L (ref 21–32)
COLOR UR: YELLOW
CREAT SERPL-MCNC: 0.8 MG/DL (ref 0.6–1.3)
EOSINOPHIL # BLD AUTO: 0.1 K/UL (ref 0–0.4)
EOSINOPHIL NFR BLD AUTO: 1.2 % (ref 0–4)
ERYTHROCYTE [DISTWIDTH] IN BLOOD BY AUTOMATED COUNT: 16.7 % (ref 11.6–13.7)
GFR SERPL CREATININE-BSD FRML MDRD: 105 ML/MIN (ref 90–?)
GLUCOSE SERPL-MCNC: 84 MG/DL (ref 74–106)
GLUCOSE UR STRIP-MCNC: NEGATIVE MG/DL
HCT VFR BLD AUTO: 38.7 % (ref 36–48)
HGB BLD-MCNC: 12.6 G/DL (ref 12–16)
HGB UR QL STRIP: NEGATIVE
LEUKOCYTE ESTERASE UR QL STRIP: NEGATIVE
LYMPHOCYTES # BLD AUTO: 1.9 K/UL (ref 2.5–16.5)
LYMPHOCYTES NFR BLD AUTO: 27.1 % (ref 20.5–51.1)
MCH RBC QN AUTO: 26 PG (ref 27–31)
MCHC RBC AUTO-ENTMCNC: 33 G/DL (ref 33–37)
MCV RBC AUTO: 79.3 FL (ref 80–94)
MONOCYTES # BLD AUTO: 0.5 K/UL (ref 0.8–1)
MONOCYTES NFR BLD AUTO: 6.9 % (ref 1.7–9.3)
NEUTROPHILS # BLD AUTO: 4.6 K/UL (ref 1.8–7.7)
NEUTROPHILS NFR BLD AUTO: 64.2 % (ref 42.2–75.2)
NITRITE UR QL STRIP: NEGATIVE
OPIATES UR QL SCN: NEGATIVE NG/ML
PCP UR QL SCN: NEGATIVE NG/ML
PH UR STRIP: 7 [PH] (ref 5–9)
PLATELET # BLD AUTO: 245 K/UL (ref 140–450)
POTASSIUM SERPL-SCNC: 3.5 MMOL/L (ref 3.5–5.1)
PROT UR QL STRIP: NEGATIVE
RBC # BLD AUTO: 4.88 MIL/UL (ref 4.2–5.4)
SODIUM SERPL-SCNC: 141 MMOL/L (ref 136–145)
SP GR UR STRIP: 1.02 (ref 1–1.03)
UROBILINOGEN UR STRIP-MCNC: 1 EU/DL (ref 0.2–1)
WBC # BLD AUTO: 7.1 K/UL (ref 4.8–10.8)

## 2024-05-08 PROCEDURE — 84484 ASSAY OF TROPONIN QUANT: CPT

## 2024-05-08 PROCEDURE — 80305 DRUG TEST PRSMV DIR OPT OBS: CPT

## 2024-05-08 PROCEDURE — 81003 URINALYSIS AUTO W/O SCOPE: CPT

## 2024-05-08 PROCEDURE — 96372 THER/PROPH/DIAG INJ SC/IM: CPT

## 2024-05-08 PROCEDURE — 93005 ELECTROCARDIOGRAM TRACING: CPT

## 2024-05-08 PROCEDURE — 80048 BASIC METABOLIC PNL TOTAL CA: CPT

## 2024-05-08 PROCEDURE — 74176 CT ABD & PELVIS W/O CONTRAST: CPT

## 2024-05-08 PROCEDURE — 81025 URINE PREGNANCY TEST: CPT

## 2024-05-08 PROCEDURE — 85025 COMPLETE CBC W/AUTO DIFF WBC: CPT

## 2024-05-08 PROCEDURE — 99285 EMERGENCY DEPT VISIT HI MDM: CPT

## 2024-05-08 PROCEDURE — 85379 FIBRIN DEGRADATION QUANT: CPT

## 2024-05-08 PROCEDURE — 36415 COLL VENOUS BLD VENIPUNCTURE: CPT

## 2024-05-09 VITALS
TEMPERATURE: 97.6 F | OXYGEN SATURATION: 100 % | RESPIRATION RATE: 18 BRPM | SYSTOLIC BLOOD PRESSURE: 98 MMHG | HEART RATE: 78 BPM | DIASTOLIC BLOOD PRESSURE: 60 MMHG

## 2024-05-09 RX ADMIN — ONDANSETRON ONE MG: 4 TABLET, ORALLY DISINTEGRATING ORAL at 00:50

## 2024-05-09 RX ADMIN — KETOROLAC TROMETHAMINE ONE MG: 30 INJECTION, SOLUTION INTRAMUSCULAR; INTRAVENOUS at 00:47

## 2024-07-24 ENCOUNTER — HOSPITAL ENCOUNTER (EMERGENCY)
Dept: HOSPITAL 26 - MED | Age: 34
Discharge: HOME | End: 2024-07-24
Payer: MEDICAID

## 2024-07-24 VITALS
RESPIRATION RATE: 16 BRPM | HEART RATE: 77 BPM | TEMPERATURE: 97.1 F | DIASTOLIC BLOOD PRESSURE: 79 MMHG | OXYGEN SATURATION: 97 % | SYSTOLIC BLOOD PRESSURE: 136 MMHG

## 2024-07-24 VITALS
RESPIRATION RATE: 18 BRPM | TEMPERATURE: 98.3 F | SYSTOLIC BLOOD PRESSURE: 131 MMHG | OXYGEN SATURATION: 99 % | HEART RATE: 70 BPM | DIASTOLIC BLOOD PRESSURE: 70 MMHG

## 2024-07-24 VITALS — BODY MASS INDEX: 43.4 KG/M2 | HEIGHT: 69 IN | WEIGHT: 293 LBS

## 2024-07-24 DIAGNOSIS — Z86.69: ICD-10-CM

## 2024-07-24 DIAGNOSIS — I10: ICD-10-CM

## 2024-07-24 DIAGNOSIS — Z79.899: ICD-10-CM

## 2024-07-24 DIAGNOSIS — M79.2: Primary | ICD-10-CM

## 2024-07-24 PROCEDURE — 99284 EMERGENCY DEPT VISIT MOD MDM: CPT

## 2024-07-24 PROCEDURE — 73060 X-RAY EXAM OF HUMERUS: CPT

## 2024-07-24 PROCEDURE — 96372 THER/PROPH/DIAG INJ SC/IM: CPT

## 2024-07-24 PROCEDURE — 81025 URINE PREGNANCY TEST: CPT

## 2024-07-24 PROCEDURE — 73080 X-RAY EXAM OF ELBOW: CPT

## 2024-07-24 RX ADMIN — KETOROLAC TROMETHAMINE ONE MG: 30 INJECTION, SOLUTION INTRAMUSCULAR; INTRAVENOUS at 14:10

## 2024-07-28 ENCOUNTER — HOSPITAL ENCOUNTER (EMERGENCY)
Dept: HOSPITAL 26 - MED | Age: 34
Discharge: HOME | End: 2024-07-28
Payer: MEDICAID

## 2024-07-28 VITALS
HEART RATE: 66 BPM | DIASTOLIC BLOOD PRESSURE: 75 MMHG | OXYGEN SATURATION: 98 % | TEMPERATURE: 97.9 F | SYSTOLIC BLOOD PRESSURE: 121 MMHG | RESPIRATION RATE: 20 BRPM

## 2024-07-28 VITALS — HEIGHT: 64 IN | WEIGHT: 293 LBS | BODY MASS INDEX: 50.02 KG/M2

## 2024-07-28 DIAGNOSIS — I10: ICD-10-CM

## 2024-07-28 DIAGNOSIS — Z79.2: ICD-10-CM

## 2024-07-28 DIAGNOSIS — Z86.69: ICD-10-CM

## 2024-07-28 DIAGNOSIS — Z79.899: ICD-10-CM

## 2024-07-28 DIAGNOSIS — R07.89: ICD-10-CM

## 2024-07-28 DIAGNOSIS — J06.9: Primary | ICD-10-CM

## 2024-07-28 LAB
ANION GAP SERPL CALCULATED.3IONS-SCNC: 12.3 MMOL/L (ref 8–16)
BASOPHILS # BLD AUTO: 0 K/UL (ref 0–0.22)
BASOPHILS NFR BLD AUTO: 0.7 % (ref 0–2)
BUN SERPL-MCNC: 17 MG/DL (ref 7–18)
CALCIUM SPEC-MCNC: 8.9 MG/DL (ref 8.5–10.1)
CHLORIDE SERPL-SCNC: 105 MMOL/L (ref 98–107)
CO2 SERPL-SCNC: 25.2 MMOL/L (ref 21–32)
CREAT SERPL-MCNC: 0.8 MG/DL (ref 0.6–1.3)
EOSINOPHIL # BLD AUTO: 0.1 K/UL (ref 0–0.4)
EOSINOPHIL NFR BLD AUTO: 1.3 % (ref 0–4)
ERYTHROCYTE [DISTWIDTH] IN BLOOD BY AUTOMATED COUNT: 15.9 % (ref 11.6–13.7)
GFR SERPL CREATININE-BSD FRML MDRD: 105 ML/MIN (ref 90–?)
GLUCOSE SERPL-MCNC: 100 MG/DL (ref 74–106)
HCT VFR BLD AUTO: 37.1 % (ref 36–48)
HGB BLD-MCNC: 12 G/DL (ref 12–16)
LYMPHOCYTES # BLD AUTO: 1.3 K/UL (ref 2.5–16.5)
LYMPHOCYTES NFR BLD AUTO: 22.7 % (ref 20.5–51.1)
MCH RBC QN AUTO: 26 PG (ref 27–31)
MCHC RBC AUTO-ENTMCNC: 33 G/DL (ref 33–37)
MCV RBC AUTO: 79.5 FL (ref 80–94)
MONOCYTES # BLD AUTO: 0.3 K/UL (ref 0.8–1)
MONOCYTES NFR BLD AUTO: 6 % (ref 1.7–9.3)
NEUTROPHILS # BLD AUTO: 3.8 K/UL (ref 1.8–7.7)
NEUTROPHILS NFR BLD AUTO: 69.3 % (ref 42.2–75.2)
PLATELET # BLD AUTO: 253 K/UL (ref 140–450)
POTASSIUM SERPL-SCNC: 4.5 MMOL/L (ref 3.5–5.1)
RBC # BLD AUTO: 4.66 MIL/UL (ref 4.2–5.4)
SODIUM SERPL-SCNC: 138 MMOL/L (ref 136–145)
WBC # BLD AUTO: 5.5 K/UL (ref 4.8–10.8)